# Patient Record
Sex: FEMALE | Race: WHITE
[De-identification: names, ages, dates, MRNs, and addresses within clinical notes are randomized per-mention and may not be internally consistent; named-entity substitution may affect disease eponyms.]

---

## 2017-01-06 NOTE — MAMMOGRAPHY REPORT
UNILATERAL LEFT DIGITAL DIAGNOSTIC MAMMOGRAM TOMOSYNTHESIS AND TARGETED LEFT ULTRASOUND: 1/6/2017

CLINICAL HISTORY: Callback from screening mammogram for left breast mass.  





TECHNIQUE:  Breast tomosynthesis in addition to standard 2D mammography was performed.  Spot tamir
gavin left MLO 2-D and tomosynthesis images were obtained.



COMPARISON: Comparison is made to exams dated:  12/28/2016 mammogram, 12/23/2015 mammogram, 12/17/20
14 mammogram, 12/10/2013 mammogram, 12/18/2012 mammogram, and 12/5/2011 mammogram - [facility].   



BREAST COMPOSITION:  The tissue of the left breast is heterogeneously dense, which may obscure small
 masses.  



FINDINGS:  Spot compression views demonstrate a persistent oval partially circumscribed and partiall
y obscured 8 mm mass seen within the left superior breast.  This is likely located in the medial guillermo
ast when reviewing the cc images from the screening mammogram.



Targeted ultrasound was performed of the left breast in the region of the mammographic mass.  In the
 left breast at 11:00, 4 cm from the nipple, there is an oval anechoic circumscribed 8 x 6 mm mass. 
 This is felt to correspond with the mammographic mass and is consistent with a benign cyst.  Rupali
us other cysts were noted during the ultrasound exam, including a group of cysts in the left breast 
at 12:00 measuring 1.3 x 0.7 cm, as well as a 7 x 6 mm cyst in the left breast at 2:00, 3 cm from th
e nipple.  No suspicious solid masses were evident.



IMPRESSION:  ACR BI-RADS CATEGORY 2: BENIGN, TARGETED ULTRASOUND ACR BI-RADS CATEGORY 2: BENIGN 

Benign 8 mm cyst seen within the left breast at 11:00 on ultrasound, which is felt to correlate with
 the mammographic mass.  Multiple other cysts were seen during the ultrasound exam.  There is no cristi
mographic or targeted sonographic evidence of malignancy. A 1 year screening mammogram is recommende
d.  The patient has been verbally notified of the results.  





Approximately 10% of breast cancers are not detected with mammography. A negative mammographic repor
t should not delay biopsy if a clinically suggestive mass is present.



Sasha Burks M.D.          

/:1/6/2017 09:50:51  



Imaging Technologist: Gisel Rogers, [facility]

letter sent: Normal 1/2  

BI-RADS Code: ACR BI-RADS Category 2: Benign  Ultrasound BI-RADS: ACR BI-RADS Category 2: Benign

## 2017-01-26 NOTE — DIAGNOSTIC IMAGING REPORT
LEFT HEEL MIN 2 VIEWS



CLINICAL HISTORY: Left heel pain. Fall.   



COMPARISON STUDY:  None.



FINDINGS: Mildly angulated fracture at the plantar heel spur. The remaining

portions of the calcaneus are intact. No radiopaque foreign bodies.



IMPRESSION:  Mildly angulated acute fracture at the base of the plantar heel

spur. 









Electronically signed by:  Mikel Andrade M.D.

1/26/2017 1:59 PM



Dictated Date/Time:  1/26/2017 1:58 PM

## 2018-02-23 NOTE — MAMMOGRAPHY REPORT
BILATERAL DIGITAL SCREENING MAMMOGRAM TOMOSYNTHESIS WITH CAD: 2/22/2018

CLINICAL HISTORY: Routine screening.  Patient has no complaints.  





TECHNIQUE:  Breast tomosynthesis in addition to standard 2D mammography was performed. Current study 
was also evaluated with a Computer Aided Detection (CAD) system.  



COMPARISON: Comparison is made to exams dated:  1/6/2017 ultrasound, 1/6/2017 mammogram, 12/28/2016 m
ammogram, 12/23/2015 mammogram, 12/17/2014 mammogram, and 12/10/2013 ultrasound - [facility].   



BREAST COMPOSITION:  The tissue of both breasts is extremely dense, which lowers the sensitivity of m
ammography.  



FINDINGS: There are diffuse bilateral pontine micro-calcifications, most numerous in the upper outer 
quadrant, stable comparing back to at least 2013.  Stable partially circumscribed mass in the 11:00 t
o 12:00 posterior left breast.  No suspicious spiculated or irregular mass, architectural distortion 
or cluster of new, suspicious microcalcifications is seen.  



IMPRESSION:  ACR BI-RADS CATEGORY 1: NEGATIVE

There is no mammographic evidence of malignancy. A 1 year screening mammogram is recommended.  The pa
tient will receive written notification of the results.  





Approximately 10% of breast cancers are not detected with mammography. A negative mammographic report
 should not delay biopsy if a clinically suggestive mass is present.



Luisana Mckenna M.D.          

ay/:2/22/2018 19:56:41  



Imaging Technologist: Gisel Rogers, [facility]

letter sent: Normal 1/2  

BI-RADS Code: ACR BI-RADS Category 1: Negative

## 2018-04-11 ENCOUNTER — HOSPITAL ENCOUNTER (OUTPATIENT)
Dept: HOSPITAL 45 - C.GI | Age: 53
Discharge: HOME | End: 2018-04-11
Attending: INTERNAL MEDICINE
Payer: COMMERCIAL

## 2018-04-11 VITALS
WEIGHT: 130.27 LBS | BODY MASS INDEX: 22.24 KG/M2 | BODY MASS INDEX: 22.24 KG/M2 | HEIGHT: 64.02 IN | HEIGHT: 64.02 IN | WEIGHT: 130.27 LBS

## 2018-04-11 VITALS — OXYGEN SATURATION: 100 % | DIASTOLIC BLOOD PRESSURE: 69 MMHG | HEART RATE: 60 BPM | SYSTOLIC BLOOD PRESSURE: 106 MMHG

## 2018-04-11 DIAGNOSIS — K64.8: ICD-10-CM

## 2018-04-11 DIAGNOSIS — Z98.890: ICD-10-CM

## 2018-04-11 DIAGNOSIS — Z12.11: Primary | ICD-10-CM

## 2018-04-11 NOTE — DISCHARGE INSTRUCTIONS
Endoscopy Patient Instructions


Date / Procedure(s) Performed


Apr 11, 2018.


Colonoscopy





Allergy Information


Coded Allergies:  


     No Known Allergies (Unverified , 4/11/18)





Discharge Date / Findings


Apr 11, 2018.


Internal hemorrhoids





Medication Instructions


OK to resume all medications today as prescribed





Reported Home Medications








 Medications  Dose


 Route/Sig


 Max Daily Dose Days Date Category


 


 No Active


 Prescriptions or


 Reported


 Medications  


 


     Rx











Provider Instructions





Activity Restrictions





-  No exercising or heavy lifting for 24 hours. 


-  Do not drink alcohol the day of the procedure.


-  Do not drive a car or operate machinery until the day after the procedure.


-  Do not make any important decisions or sign important papers in 24 hours 

after the procedure.





Following Day:





-  Return to full activity which may include returning to work/school.





Diet





Start your diet with liquids and light foods (jello, soup, juice, toast).  Then 

eat your usual diet if not nauseated.





Treatment For Common After Affects





For mild abdominal pain, bloating, or excessive gas:





-  Rest


-  Eat lightly


-  Lie on right side





Follow-Up Information


Follow-up with Dr. Greer as scheduled





Anesthesia Information





What You Should Know





You have had a procedure that required some medicine to reduce anxiety and 

discomfort. This treatment is called moderate sedation.  


After receiving the treatment, you may be sleepy, but you will be able to 

breathe on your own.  The effects of the treatment may last for several hours.








Follow these instructions along with Activity/Diet recommendations noted above:





*  Do NOT do anything where dizziness or clumsiness would be dangerous.





*  Rest quietly at home today, then you can be up and about tomorrow.





*  Have a responsible person stay with you the rest of today.





*  You may have had an I.V. today.  If so, you may take the dressing off later 

today.





Recommendations


 


Call your doctor if:





*  Trouble breathing 





*  Continuous vomiting for more than 24 hours








*  Temperature above 101 degrees





*  Severe abdominal pain or bloating





*  Pain not relieved by pain medicine ordered





*  There is increased drainage or redness from any incision





*  A large amount of rectal bleeding greater than 2-3 tablespoons. 


   (If you had a polyp/s removed or have hemorrhoids, a small amount of blood -


    from the rectum is to be expected.)





*  You have any unanswered questions or concerns.








IN THE EVENT OF A SERIOUS EMERGENCY, GO TO THE NEAREST EMERGENCY ROOM








       Your discharge instructions were prepared by provider Uriel Alvarez.





 Patient Instructions Signature Page














Che Schwab 











Patient (or Guardian) Signature/Date:____________________________________ I 

have read and understand the instructions given to me by my caregivers.








Caregiver/RN/Doctor Signature/Date:____________________________________











The above-named patient and/or guardian has received patient instructions on 

this date.





























+  Original Patient Signature Page (only) stays with chart.  Please make copy 

for patient.

## 2018-04-11 NOTE — ANESTHESIOLOGY PROGRESS NOTE
Anesthesia Post Op Note


Date & Time


Apr 11, 2018 at 13:28





Vital Signs


Pain Intensity:  0





Vital Signs Past 12 Hours








  Date Time  Temp Pulse Resp B/P (MAP) Pulse Ox O2 Delivery O2 Flow Rate FiO2


 


4/11/18 13:00  60 20 106/69 (81) 100 Room Air  


 


4/11/18 12:45  59 20 103/68 (80) 100 Room Air  


 


4/11/18 12:30  66 20 101/58 (72) 100 Room Air  


 


4/11/18 11:15 36.7 51 20 93/63 (73) 99 Room Air  











Notes


Mental Status:  alert / awake / arousable, participated in evaluation


Pt Amnestic to Procedure:  Yes


Nausea / Vomiting:  adequately controlled


Pain:  adequately controlled


Airway Patency, RR, SpO2:  stable & adequate


BP & HR:  stable & adequate


Hydration State:  stable & adequate


Anesthetic Complications:  no major complications apparent

## 2018-04-11 NOTE — GI REPORT
Procedure Date: 4/11/2018 11:47 AM

Procedure:            Colonoscopy

Indications:          Screening for colorectal malignant neoplasm

Medicines:            Monitored Anesthesia Care

Complications:        No immediate complications.

Estimated Blood Loss: Estimated blood loss: none.

Procedure:            Pre-Anesthesia Assessment:

                      - Prior to the procedure, a History and Physical was 

                      performed, and patient medications and allergies were 

                      reviewed. The patient's tolerance of previous 

                      anesthesia was also reviewed. The risks and benefits of 

                      the procedure and the sedation options and risks were 

                      discussed with the patient. All questions were 

                      answered, and informed consent was obtained. Prior 

                      Anticoagulants: The patient has taken no previous 

                      anticoagulant or antiplatelet agents. ASA Grade 

                      Assessment: I - A normal, healthy patient. After 

                      reviewing the risks and benefits, the patient was 

                      deemed in satisfactory condition to undergo the 

                      procedure.

                      After I obtained informed consent, the scope was passed 

                      under direct vision. Throughout the procedure, the 

                      patient's blood pressure, pulse, and oxygen saturations 

                      were monitored continuously. The scope was introduced 

                      through the anus and advanced to the terminal ileum. 

                      The colonoscopy was performed without difficulty. The 

                      patient tolerated the procedure well. The quality of 

                      the bowel preparation was good. The terminal ileum, 

                      ileocecal valve, appendiceal orifice, and rectum were 

                      photographed.

Findings:

     The perianal and digital rectal examinations were normal.

     Non-bleeding internal hemorrhoids were found during retroflexion. The 

     hemorrhoids were small.

Impression:           - Non-bleeding internal hemorrhoids.

                      - No specimens collected.

Recommendation:       - Resume previous diet.

                      - Continue present medications.

                      - Repeat colonoscopy in 10 years for surveillance.

                      - Return to primary care physician as previously 

                      scheduled.

Uriel Alvarez DO

4/11/2018 12:32:45 PM

This report has been signed electronically.

Note Initiated On: 4/11/2018 11:47 AM

     I attest to the content of the Intraoperative Record and orders 

     documented therein, exceptions below

## 2018-04-11 NOTE — ENDO HISTORY AND PHYSICAL
History & Physical


Date of Service:


Apr 11, 2018.


Chief Complaint:


screening


Referring Physician:


Dr. Greer


History of Present Illness


52 yo CF who presents for screening colonoscopy.





Past Surgical History


Hx Cardiac Surgery:  No


Hx Internal Defibrillator:  No


Hx Pacemaker:  No


Hx Abdominal Surgery:  No


Hx of Implantable Prosthesis:  No


Hx Post-Op Nausea and Vomiting:  No


Hx Cancer Surgery:  No


Hx Thoracic Surgery:  No


Hx Orthopedic:  No


Hx Urinary Tract Surgery:  No





Family History


None





Social History


Smoking Status:  Never Smoker


Hx Substance Use:  No


Hx Alcohol Use:  Yes (rare)





Allergies


Coded Allergies:  


     No Known Allergies (Unverified , 4/11/18)





Current Medications





Reported Home Medications








 Medications  Dose


 Route/Sig


 Max Daily Dose Days Date Category


 


 No Active


 Prescriptions or


 Reported


 Medications  


 


     Rx











Vital Signs


Weight (Kilograms):  59.09


Height (Feet):  5


Height (Inches):  4











  Date Time  Temp Pulse Resp B/P (MAP) Pulse Ox O2 Delivery O2 Flow Rate FiO2


 


4/11/18 11:15 36.7 51 20 93/63 (73) 99 Room Air  











Physical Exam


General Appearance:  WD/WN, no apparent distress


Respiratory/Chest:  


   Auscultation:  breath sounds normal


Cardiovascular:  


   Heart Auscultation:  RRR


Abdomen:  


   Bowel Sounds:  normal


   Inspection & Palpation:  soft, non-distended, no tenderness, guarding & 

rebound





Assessment and Plan


Assessment:


52 yo CF who presents for screening colonoscopy.








Plan:


Proceed with colonoscopy.